# Patient Record
Sex: FEMALE | Race: WHITE | NOT HISPANIC OR LATINO | Employment: FULL TIME | URBAN - METROPOLITAN AREA
[De-identification: names, ages, dates, MRNs, and addresses within clinical notes are randomized per-mention and may not be internally consistent; named-entity substitution may affect disease eponyms.]

---

## 2017-07-07 ENCOUNTER — OFFICE VISIT (OUTPATIENT)
Dept: OBSTETRICS AND GYNECOLOGY | Facility: CLINIC | Age: 32
End: 2017-07-07
Payer: COMMERCIAL

## 2017-07-07 VITALS
BODY MASS INDEX: 19.24 KG/M2 | HEIGHT: 69 IN | SYSTOLIC BLOOD PRESSURE: 100 MMHG | WEIGHT: 129.88 LBS | DIASTOLIC BLOOD PRESSURE: 64 MMHG

## 2017-07-07 DIAGNOSIS — O03.9 SPONTANEOUS ABORTION: Primary | ICD-10-CM

## 2017-07-07 PROCEDURE — 99202 OFFICE O/P NEW SF 15 MIN: CPT | Mod: S$GLB,,, | Performed by: OBSTETRICS & GYNECOLOGY

## 2017-07-07 PROCEDURE — 99999 PR PBB SHADOW E&M-NEW PATIENT-LVL II: CPT | Mod: PBBFAC,,, | Performed by: OBSTETRICS & GYNECOLOGY

## 2017-07-07 NOTE — PROGRESS NOTES
"Sarah Torres is a 32 y.o.  s/p spontaneous Ab on  mx'ed at [facility]   H/H 12.9/38.7  O+  HCG      Patient's last menstrual period was 2017.    Pt states she had an U/S w her OB on  showing an IUP    Bleeding very little  No pain or cramps  No fever    Past Medical History:   Diagnosis Date    Abnormal Pap smear of cervix 2016    HPV colpo done normal       History reviewed. No pertinent surgical history.    No current outpatient prescriptions on file.     No current facility-administered medications for this visit.           Review of patient's allergies indicates:  No Known Allergies    .  Family History   Problem Relation Age of Onset    Ovarian cancer Maternal Aunt     Breast cancer Neg Hx     Colon cancer Neg Hx     Deep vein thrombosis Neg Hx        Social History   Substance Use Topics    Smoking status: Never Smoker    Smokeless tobacco: Never Used    Alcohol use Yes      Comment: socially       /64   Ht 5' 9" (1.753 m)   Wt 58.9 kg (129 lb 13.6 oz)   LMP 2017   BMI 19.18 kg/m²     ROS:  GENERAL: No acute complaints.     GEN:  Alert and oriented lady in no distress. Got teary during hx                ABDOMEN: Flat, soft and non tender. No mass, hepatomegaly, RUQT or CVAT.   PELVIC:      Vulva: normal genitalia.       Vagina: scant old blood, no unusual discharge         Cervix: Normal appearance. Os closed and no bleeding or discharge.        Uterus: Normal size, shape, position, non tender. No cervical motion tenderness.           Bladder base is non tender.      Adnexa: No masses, tenderness      Anus: normal appearing.    IMPRESSION: s/p Ab, seems down but appropriate      COUNSELING:  - Reassurance re future preg  - stressed need to f/u til hcg neg    PLAN: repeat hcg in 2 wks - call for results or prn any concerns or depression          "